# Patient Record
Sex: FEMALE | Race: WHITE | NOT HISPANIC OR LATINO | Employment: OTHER | URBAN - METROPOLITAN AREA
[De-identification: names, ages, dates, MRNs, and addresses within clinical notes are randomized per-mention and may not be internally consistent; named-entity substitution may affect disease eponyms.]

---

## 2021-06-01 ENCOUNTER — TRANSCRIBE ORDERS (OUTPATIENT)
Dept: ADMINISTRATIVE | Facility: HOSPITAL | Age: 61
End: 2021-06-01

## 2021-06-01 DIAGNOSIS — Z12.31 VISIT FOR SCREENING MAMMOGRAM: Primary | ICD-10-CM

## 2021-07-26 ENCOUNTER — PREP FOR SURGERY (OUTPATIENT)
Dept: OTHER | Facility: HOSPITAL | Age: 61
End: 2021-07-26

## 2021-07-26 ENCOUNTER — CLINICAL SUPPORT (OUTPATIENT)
Dept: GASTROENTEROLOGY | Facility: CLINIC | Age: 61
End: 2021-07-26

## 2021-07-26 ENCOUNTER — TELEPHONE (OUTPATIENT)
Dept: GASTROENTEROLOGY | Facility: CLINIC | Age: 61
End: 2021-07-26

## 2021-07-26 DIAGNOSIS — K92.1 BLOOD IN STOOL: Primary | ICD-10-CM

## 2021-07-26 RX ORDER — LANOLIN ALCOHOL/MO/W.PET/CERES
1000 CREAM (GRAM) TOPICAL DAILY
COMMUNITY

## 2021-07-26 RX ORDER — UBIDECARENONE 100 MG
100 CAPSULE ORAL DAILY
COMMUNITY

## 2021-07-26 RX ORDER — FLUTICASONE PROPIONATE 50 MCG
2 SPRAY, SUSPENSION (ML) NASAL DAILY PRN
COMMUNITY
Start: 2021-05-27

## 2021-07-26 RX ORDER — SODIUM, POTASSIUM,MAG SULFATES 17.5-3.13G
1 SOLUTION, RECONSTITUTED, ORAL ORAL EVERY 12 HOURS
Qty: 254 ML | Refills: 0 | Status: SHIPPED | OUTPATIENT
Start: 2021-07-26 | End: 2023-03-30

## 2021-07-26 RX ORDER — OMEPRAZOLE 40 MG/1
40 CAPSULE, DELAYED RELEASE ORAL DAILY
COMMUNITY
Start: 2021-07-23

## 2021-07-26 RX ORDER — CETIRIZINE HYDROCHLORIDE 10 MG/1
10 TABLET ORAL DAILY
COMMUNITY
Start: 2021-07-13

## 2021-07-26 RX ORDER — SERTRALINE HYDROCHLORIDE 25 MG/1
25 TABLET, FILM COATED ORAL DAILY
COMMUNITY
Start: 2021-07-13

## 2021-07-26 RX ORDER — PRAVASTATIN SODIUM 10 MG
10 TABLET ORAL DAILY
COMMUNITY
Start: 2021-07-23

## 2021-07-26 NOTE — PROGRESS NOTES
Spoke with pt on a date for colon of 09/28/2021. Updated chart with meds, history, and contacts. Went over suprep instructions and mailed them out. Put in order for colon and sent in prep  Answers for HPI/ROS submitted by the patient on 7/19/2021  Please describe your symptoms.: Bright red Blood when I have a bowel movement. On and off for a few months  Have you had these symptoms before?: Yes  How long have you been having these symptoms?: Greater than 2 weeks  Please list any medications you are currently taking for this condition.: None  Please describe any probable cause for these symptoms. : Some constipation due to sitting and driving for a long period  What is the primary reason for your visit?: Other

## 2021-08-13 ENCOUNTER — TRANSCRIBE ORDERS (OUTPATIENT)
Dept: ADMINISTRATIVE | Facility: HOSPITAL | Age: 61
End: 2021-08-13

## 2021-08-13 DIAGNOSIS — N63.0 BREAST MASS: Primary | ICD-10-CM

## 2021-08-19 ENCOUNTER — TRANSCRIBE ORDERS (OUTPATIENT)
Dept: ADMINISTRATIVE | Facility: HOSPITAL | Age: 61
End: 2021-08-19

## 2021-08-19 DIAGNOSIS — R22.30 AXILLARY FULLNESS: Primary | ICD-10-CM

## 2021-08-23 ENCOUNTER — HOSPITAL ENCOUNTER (OUTPATIENT)
Dept: MAMMOGRAPHY | Facility: HOSPITAL | Age: 61
Discharge: HOME | End: 2021-08-23

## 2021-08-23 ENCOUNTER — HOSPITAL ENCOUNTER (OUTPATIENT)
Dept: ULTRASOUND IMAGING | Facility: HOSPITAL | Age: 61
Discharge: HOME | End: 2021-08-23

## 2021-08-23 DIAGNOSIS — N63.0 BREAST MASS: ICD-10-CM

## 2021-08-23 PROCEDURE — 76641 ULTRASOUND BREAST COMPLETE: CPT

## 2021-08-23 PROCEDURE — 77065 DX MAMMO INCL CAD UNI: CPT

## 2021-08-26 ENCOUNTER — HOSPITAL ENCOUNTER (OUTPATIENT)
Dept: CT IMAGING | Facility: HOSPITAL | Age: 61
Discharge: HOME | End: 2021-08-26
Admitting: NURSE PRACTITIONER

## 2021-08-26 DIAGNOSIS — R22.30 AXILLARY FULLNESS: ICD-10-CM

## 2021-08-26 LAB — CREAT BLDA-MCNC: 1 MG/DL

## 2021-08-26 PROCEDURE — 82565 ASSAY OF CREATININE: CPT

## 2021-08-26 PROCEDURE — 0 IOPAMIDOL PER 1 ML: Performed by: NURSE PRACTITIONER

## 2021-08-26 PROCEDURE — 71260 CT THORAX DX C+: CPT

## 2021-08-26 RX ADMIN — IOPAMIDOL 100 ML: 755 INJECTION, SOLUTION INTRAVENOUS at 16:20

## 2021-09-20 ENCOUNTER — TRANSCRIBE ORDERS (OUTPATIENT)
Dept: ADMINISTRATIVE | Facility: HOSPITAL | Age: 61
End: 2021-09-20

## 2021-09-20 DIAGNOSIS — R59.0 AXILLARY ADENOPATHY: Primary | ICD-10-CM

## 2021-09-21 ENCOUNTER — ANESTHESIA (OUTPATIENT)
Dept: GASTROENTEROLOGY | Facility: HOSPITAL | Age: 61
End: 2021-09-21

## 2021-09-21 ENCOUNTER — ANESTHESIA EVENT (OUTPATIENT)
Dept: GASTROENTEROLOGY | Facility: HOSPITAL | Age: 61
End: 2021-09-21

## 2021-09-21 ENCOUNTER — HOSPITAL ENCOUNTER (OUTPATIENT)
Facility: HOSPITAL | Age: 61
Setting detail: HOSPITAL OUTPATIENT SURGERY
Discharge: HOME | End: 2021-09-21
Attending: INTERNAL MEDICINE | Admitting: INTERNAL MEDICINE

## 2021-09-21 VITALS
OXYGEN SATURATION: 99 % | HEART RATE: 70 BPM | HEIGHT: 64 IN | SYSTOLIC BLOOD PRESSURE: 108 MMHG | BODY MASS INDEX: 36.58 KG/M2 | DIASTOLIC BLOOD PRESSURE: 74 MMHG | TEMPERATURE: 97.8 F | WEIGHT: 214.29 LBS | RESPIRATION RATE: 17 BRPM

## 2021-09-21 DIAGNOSIS — K92.1 BLOOD IN STOOL: ICD-10-CM

## 2021-09-21 PROCEDURE — 88305 TISSUE EXAM BY PATHOLOGIST: CPT | Performed by: INTERNAL MEDICINE

## 2021-09-21 PROCEDURE — 25010000002 ATROPINE PER 0.01 MG: Performed by: NURSE ANESTHETIST, CERTIFIED REGISTERED

## 2021-09-21 PROCEDURE — 25010000002 PROPOFOL 10 MG/ML EMULSION: Performed by: NURSE ANESTHETIST, CERTIFIED REGISTERED

## 2021-09-21 PROCEDURE — 45385 COLONOSCOPY W/LESION REMOVAL: CPT | Performed by: INTERNAL MEDICINE

## 2021-09-21 RX ORDER — SODIUM CHLORIDE, SODIUM LACTATE, POTASSIUM CHLORIDE, CALCIUM CHLORIDE 600; 310; 30; 20 MG/100ML; MG/100ML; MG/100ML; MG/100ML
30 INJECTION, SOLUTION INTRAVENOUS CONTINUOUS
Status: DISCONTINUED | OUTPATIENT
Start: 2021-09-21 | End: 2021-09-21 | Stop reason: HOSPADM

## 2021-09-21 RX ORDER — PROPOFOL 10 MG/ML
VIAL (ML) INTRAVENOUS AS NEEDED
Status: DISCONTINUED | OUTPATIENT
Start: 2021-09-21 | End: 2021-09-21 | Stop reason: SURG

## 2021-09-21 RX ORDER — GLYCOPYRROLATE 0.2 MG/ML
INJECTION INTRAMUSCULAR; INTRAVENOUS AS NEEDED
Status: DISCONTINUED | OUTPATIENT
Start: 2021-09-21 | End: 2021-09-21 | Stop reason: SURG

## 2021-09-21 RX ORDER — LIDOCAINE HYDROCHLORIDE 20 MG/ML
INJECTION, SOLUTION INFILTRATION; PERINEURAL AS NEEDED
Status: DISCONTINUED | OUTPATIENT
Start: 2021-09-21 | End: 2021-09-21 | Stop reason: SURG

## 2021-09-21 RX ORDER — ATROPINE SULFATE 0.4 MG/ML
AMPUL (ML) INJECTION AS NEEDED
Status: DISCONTINUED | OUTPATIENT
Start: 2021-09-21 | End: 2021-09-21 | Stop reason: SURG

## 2021-09-21 RX ADMIN — ATROPINE SULFATE 0.1 MG: 0.4 INJECTION, SOLUTION INTRAMUSCULAR; INTRAVENOUS; SUBCUTANEOUS at 12:05

## 2021-09-21 RX ADMIN — GLYCOPYRROLATE 0.2 MCG: 0.2 INJECTION INTRAMUSCULAR; INTRAVENOUS at 12:02

## 2021-09-21 RX ADMIN — PROPOFOL 200 MCG/KG/MIN: 10 INJECTION, EMULSION INTRAVENOUS at 12:01

## 2021-09-21 RX ADMIN — LIDOCAINE HYDROCHLORIDE 50 MG: 20 INJECTION, SOLUTION INFILTRATION; PERINEURAL at 12:00

## 2021-09-21 RX ADMIN — PROPOFOL 100 MG: 10 INJECTION, EMULSION INTRAVENOUS at 12:00

## 2021-09-21 RX ADMIN — SODIUM CHLORIDE, POTASSIUM CHLORIDE, SODIUM LACTATE AND CALCIUM CHLORIDE 30 ML/HR: 600; 310; 30; 20 INJECTION, SOLUTION INTRAVENOUS at 10:39

## 2021-09-21 NOTE — ANESTHESIA PREPROCEDURE EVALUATION
Anesthesia Evaluation     Patient summary reviewed and Nursing notes reviewed   History of anesthetic complications: PDPH.  NPO Solid Status: > 8 hours  NPO Liquid Status: > 2 hours           Airway   Mallampati: II  TM distance: >3 FB  Neck ROM: full  No difficulty expected  Dental      Pulmonary - negative pulmonary ROS and normal exam    breath sounds clear to auscultation  Cardiovascular - normal exam  Exercise tolerance: good (4-7 METS)    Rhythm: regular    (+) hyperlipidemia,       Neuro/Psych  (+) headaches, psychiatric history Anxiety,     GI/Hepatic/Renal/Endo    (+)  GERD, GI bleeding lower ,     Musculoskeletal (-) negative ROS    Abdominal    Substance History - negative use     OB/GYN negative ob/gyn ROS         Other - negative ROS                       Anesthesia Plan    ASA 2     general and MAC   total IV anesthesia(Patient understands anesthesia not responsible for dental damage.)  intravenous induction     Anesthetic plan, all risks, benefits, and alternatives have been provided, discussed and informed consent has been obtained with: patient.  Use of blood products discussed with patient .   Plan discussed with CRNA.

## 2021-09-21 NOTE — ANESTHESIA POSTPROCEDURE EVALUATION
Patient: Romana Cuello    Procedure Summary     Date: 09/21/21 Room / Location: [facility] ENDOSCOPY 2 / [facility] ENDOSCOPY    Anesthesia Start: 1156 Anesthesia Stop: 1222    Procedure: COLONOSCOPY WITH POLYPECTOMY (N/A ) Diagnosis:       Blood in stool      (Blood in stool [K92.1])    Surgeons: Brant Gunderson MD Provider:     Anesthesia Type: general, MAC ASA Status: 2          Anesthesia Type: general, MAC    Vitals  Vitals Value Taken Time   /74 09/21/21 1238   Temp 36.2 °C (97.1 °F) 09/21/21 1221   Pulse 76 09/21/21 1240   Resp 15 09/21/21 1225   SpO2 98 % 09/21/21 1240   Vitals shown include unvalidated device data.        Post Anesthesia Care and Evaluation    Patient location during evaluation: bedside  Patient participation: complete - patient participated  Level of consciousness: awake  Pain score: 0  Pain management: adequate  Airway patency: patent  Anesthetic complications: No anesthetic complications  PONV Status: none  Cardiovascular status: acceptable and stable  Respiratory status: acceptable and room air  Hydration status: acceptable    Comments: An Anesthesiologist personally participated in the most demanding procedures (including induction and emergence if applicable) in the anesthesia plan, monitored the course of anesthesia administration at frequent intervals and remained physically present and available for immediate diagnosis and treatment of emergencies.

## 2021-09-21 NOTE — H&P
Pre Procedure History & Physical    Chief Complaint:   Rectal bleeding    Subjective     HPI:   As above    Past Medical History:   Past Medical History:   Diagnosis Date   • Anxiety    • Elevated cholesterol    • GERD (gastroesophageal reflux disease)    • MS (congenital mitral stenosis)    • Seasonal allergies    • Spinal headache     SPINAL TAP       Past Surgical History:  Past Surgical History:   Procedure Laterality Date   • BREAST BIOPSY     • CHOLECYSTECTOMY     • COLONOSCOPY     • UPPER GASTROINTESTINAL ENDOSCOPY         Family History:  Family History   Problem Relation Age of Onset   • Colon cancer Paternal Uncle    • Malig Hyperthermia Neg Hx        Social History:   reports that she has never smoked. She has never used smokeless tobacco. She reports current alcohol use. She reports that she does not use drugs.    Medications:   Medications Prior to Admission   Medication Sig Dispense Refill Last Dose   • cetirizine (zyrTEC) 10 MG tablet Take 10 mg by mouth Daily.   9/21/2021 at Unknown time   • coenzyme Q10 100 MG capsule Take 100 mg by mouth Daily.   Past Week at Unknown time   • fluticasone (FLONASE) 50 MCG/ACT nasal spray 2 sprays into the nostril(s) as directed by provider Daily As Needed.   Past Month at Unknown time   • omeprazole (priLOSEC) 40 MG capsule Take 40 mg by mouth Daily.   9/21/2021 at Unknown time   • pravastatin (PRAVACHOL) 10 MG tablet Take 10 mg by mouth Daily.   9/21/2021 at Unknown time   • sertraline (ZOLOFT) 25 MG tablet Take 25 mg by mouth Daily.   9/21/2021 at Unknown time   • sodium-potassium-magnesium sulfates (Suprep Bowel Prep Kit) 17.5-3.13-1.6 GM/177ML solution oral solution Take 1 bottle by mouth Every 12 (Twelve) Hours. 254 mL 0 9/21/2021 at Unknown time   • vitamin B-12 (CYANOCOBALAMIN) 1000 MCG tablet Take 1,000 mcg by mouth Daily.   Past Week at Unknown time   • vitamin D3 125 MCG (5000 UT) capsule capsule Take 5,000 Units by mouth Daily.   Past Week at Unknown time  "      Allergies:  Codeine, Levaquin [levofloxacin], Lisinopril, and Sulfa antibiotics        Objective     Blood pressure 138/77, pulse 66, temperature 97.2 °F (36.2 °C), temperature source Temporal, resp. rate 20, height 162.7 cm (64.06\"), weight 97.2 kg (214 lb 4.6 oz), SpO2 96 %.    Physical Exam   Constitutional: Pt is oriented to person, place, and time and well-developed, well-nourished, and in no distress.   Mouth/Throat: Oropharynx is clear and moist.   Neck: Normal range of motion.   Cardiovascular: Normal rate, regular rhythm and normal heart sounds.    Pulmonary/Chest: Effort normal and breath sounds normal.   Abdominal: Soft. Nontender  Skin: Skin is warm and dry.   Psychiatric: Mood, memory, affect and judgment normal.     Assessment/Plan     Diagnosis:  Rectal bleeding    Anticipated Surgical Procedure:  colonoscopy    The risks, benefits, and alternatives of this procedure have been discussed with the patient or the responsible party- the patient understands and agrees to proceed.            "

## 2021-09-22 LAB
CYTO UR: NORMAL
LAB AP CASE REPORT: NORMAL
LAB AP CLINICAL INFORMATION: NORMAL
PATH REPORT.FINAL DX SPEC: NORMAL
PATH REPORT.GROSS SPEC: NORMAL

## 2021-09-24 ENCOUNTER — APPOINTMENT (OUTPATIENT)
Dept: ULTRASOUND IMAGING | Facility: HOSPITAL | Age: 61
End: 2021-09-24

## 2021-10-25 ENCOUNTER — HOSPITAL ENCOUNTER (OUTPATIENT)
Dept: ULTRASOUND IMAGING | Facility: HOSPITAL | Age: 61
Discharge: HOME | End: 2021-10-25
Admitting: FAMILY MEDICINE

## 2021-10-25 DIAGNOSIS — R59.0 AXILLARY ADENOPATHY: ICD-10-CM

## 2021-10-25 PROCEDURE — 76642 ULTRASOUND BREAST LIMITED: CPT

## 2021-11-08 ENCOUNTER — TRANSCRIBE ORDERS (OUTPATIENT)
Dept: ADMINISTRATIVE | Facility: HOSPITAL | Age: 61
End: 2021-11-08

## 2021-11-08 DIAGNOSIS — Z12.31 SCREENING MAMMOGRAM, ENCOUNTER FOR: Primary | ICD-10-CM

## 2021-11-29 ENCOUNTER — HOSPITAL ENCOUNTER (OUTPATIENT)
Dept: MAMMOGRAPHY | Facility: HOSPITAL | Age: 61
Discharge: HOME | End: 2021-11-29
Admitting: FAMILY MEDICINE

## 2021-11-29 DIAGNOSIS — Z12.31 SCREENING MAMMOGRAM, ENCOUNTER FOR: ICD-10-CM

## 2021-11-29 PROCEDURE — 77067 SCR MAMMO BI INCL CAD: CPT

## 2022-01-31 ENCOUNTER — OFFICE VISIT (OUTPATIENT)
Dept: SLEEP MEDICINE | Facility: HOSPITAL | Age: 62
End: 2022-01-31

## 2022-01-31 VITALS
TEMPERATURE: 98 F | OXYGEN SATURATION: 94 % | BODY MASS INDEX: 36.19 KG/M2 | DIASTOLIC BLOOD PRESSURE: 80 MMHG | HEART RATE: 91 BPM | HEIGHT: 64 IN | SYSTOLIC BLOOD PRESSURE: 170 MMHG | WEIGHT: 212 LBS

## 2022-01-31 DIAGNOSIS — G47.8 NON-RESTORATIVE SLEEP: ICD-10-CM

## 2022-01-31 DIAGNOSIS — G35 MULTIPLE SCLEROSIS: ICD-10-CM

## 2022-01-31 DIAGNOSIS — G47.30 OBSERVED SLEEP APNEA: Primary | ICD-10-CM

## 2022-01-31 DIAGNOSIS — G47.10 HYPERSOMNIA: ICD-10-CM

## 2022-01-31 DIAGNOSIS — R06.83 SNORING: ICD-10-CM

## 2022-01-31 PROCEDURE — G0463 HOSPITAL OUTPT CLINIC VISIT: HCPCS | Performed by: INTERNAL MEDICINE

## 2022-01-31 PROCEDURE — 99244 OFF/OP CNSLTJ NEW/EST MOD 40: CPT | Performed by: INTERNAL MEDICINE

## 2022-01-31 NOTE — PROGRESS NOTES
[facility] [address] [address]  [place]   [place] [address]  Phone: [phone]  Fax: [phone]      Romana Cuello  1960  61 y.o.  female      Referring physician/provider: Eugene Vaughan MD   PCP Eugene Vaughan MD    Type of service: Initial Sleep Medicine Consult.  Date of service: 1/31/2022      Chief Complaint   Patient presents with   • Witnessed Apnea   • Snoring   • Fatigue   • Obesity   • Daytime Sleepiness       History of present illness;  Thank you for asking to see Romana Cuello, 61 y.o.. The patient was seen today on 1/31/2022 at [facility].  The patient presents today with symptoms of snoring, non-restorative sleep and witnessed apneas. The symptoms are present for few years and they are persistent in nature.  The snoring is present in all positions and it is loud.  Patient has no prior surgery namely tonsillectomy, nasal surgery and UPPP.   she moved to [place] about 2 years ago.  She is not working at present and complains of having symptoms of sleep apnea    Patient gives the following sleep history.  Sleep schedule:  Bedtime: 10 PM  Wake time: 9 AM  Normally takes about several hoursto fall asleep  Average hours of sleep 7-1/2  Number of naps per day 1  Symptoms  In addition to snoring, nonrestorative sleep and witnessed apneas patient gives the following associated symptoms.  Have you ever awakened gasping for breath, coughing, choking: Yes   Change in weight,  Yes gained about 10 pounds  Morning headaches  No   Awaken with a sore throat or dry mouth  Yes   Leg jerking at night:  No   Crawly feeling/urge sensation to move in the legs: No   Teeth grinding:No   Have you ever awakened at night with a sour taste or burning sensation in your chest:  No   Do you have muscle weakness with laughing or anger or sleep paralysis:  No   Have you ever felt paralyzed while going to sleep or waking up:  No   Sleepwalking, nightmares, No   Nocturia (urination  "at night): 1 times per night  Memory Problem:Yes     MEDICAL CONDITIONS (PMH)  1. History of multiple sclerosis in remission  2. Hyperlipidemia  3. Depression  4. GERD    Social history:  Do you drive a commercial vehicle:  No   Shift work:  No   Tobacco use:  No   Alcohol use: 1 per week  Caffeinated drinks: 1    Family Hx (your parents and siblings)  1. Sleep apnea  2. Restless leg syndrome    Medications: reviewed    Review of systems:  Sleep: Positve for snoring,witnessed apnea and daytime excessive sleepiness with [place] Sleepiness Scale of Total score: 7   Kidney/ Bladder  Difficulty In Urination: negative  Bed Wetting: negative  Frequent Urination: negative  HEENT  Recurrent Nose Bleeds: negative  Ear pain: negative  Sores In Mouth: negative  Persistent Hoarseness: negative  Nasal Congestion: positive  Post Nasal Drip: positive  Musculoskeletal:  Neck Pain: negative  Temporomandibular Joint Pain: negative  General:  Fever: negative  Fatigue: positive  Cardiovascular:  Irregular Heartbeat: negative  Swollen Ankles Or Legs: negative  Respiratory:  Shortness Of Breath: negative  Wheezing: negative  Neuro/Paych:  Fainting Spells: negative  Dizziness: negative  Anxiety: negative  Depression: negative  Gastrointestinal:  Problem Swallowing: negative  Frequent Heartburn: negative  Abdominal Bloating: negative  Skin:  Rash: negative  Change In Nails: negative  Endocrine:  Excessive Thirst: negative  Always Too Cold: negative  Always Too Warm: negative  Hem/Lymphatic:  Swollen Glands: negative  Burses/ Bleeds Easily: negative    Physical exam:  CONSTITUTINONAL:  Vitals:    01/31/22 0900   BP: 170/80   Pulse: 91   Temp: 98 °F (36.7 °C)   SpO2: 94%   Weight: 96.2 kg (212 lb)   Height: 162.6 cm (64\")    Body mass index is 36.39 kg/m².   HEAD: atraumatic, normocephalic   EYES:pupils are round, equal and reacting to light and accommodation, conjunctiva normal  NOSE:no nasal septal defects, nasal passages are clear, no " nasal polyps,   THROAT: tonsils are not enlarged, tongue normal size, oral airway Mallampati class 3  NECK: , trachea is in the midline, thyroid not enlarged  RESPIRATORY SYSTEM: Breath sounds are normal, there are no wheezes  CARDIOVASULAR SYSTEM: Heart sounds are regular rhythm and normal rate, there are no murmurs or thrills, no edema  GASTROINTESTINAL: Soft and non-tender,liver not enlarged, no evidence of ascites  MUSCULOSKELETAL SYSTEM: Full range of motion's in all the 4 extremities, neck movement not restricted, temporomandibular joint movement normal and no tenderness  SKIN: Warm and moist, no cyanosis, no clubbing,  NEUROLOGICAL SYSTEM: Oriented x 3, no gross neurological defects, No speech defect, gait is normal  PSYCHIATRIC SYSTEM: Mood is normal, thought content is normal      Assessment and plan:  · Witnessed apneas,(R06.81) patient's symptoms and examination is consistent with sleep apnea (G47.30). I have talked to the patient about the signs and symptoms of sleep apnea. In addition, I have also discussed pathophysiology of sleep apnea.  I also discussed the complications of untreated sleep apnea including effects on hypertension, diabetes mellitus and nonrestorative sleep with hypersomnia which can increase risk for motor vehicle accidents.  Untreated sleep apnea is also a risk factor for development of atrial fibrillation, pulmonary hypertension and stroke.  Discussed in detail of various testing methods including home-based and lab based sleep studies.  Based on history and physical examination and other comorbidities the most appropriate study is full night polysomnography.  The order for the sleep study is placed in [facility].  The test will be scheduled after approval from insurance. Treatment and management will be discussed after the test is completed.  Patient was given opportunity to ask questions and all the questions were answered.   · Snoring (R06.83), snoring is the sound created by turbulent  airflow vibrating upper airway soft tissue due to limitation of inspiratory airflow. I have also discussed factors affecting snoring including sleep deprivation, sleeping on the back and alcohol ingestion. To minimize snoring, patient is advised to have adequate sleep, sleep on the side and avoid alcohol and sedative medications before bedtime  · Daytime excessive sleepiness .  It was assessed with [place] Sleepiness Scale of Total score: 7.  There are many causes for daytime excessive sleepiness including sleep depression, shiftwork syndrome, depression and other medical disorders including heart, kidney and liver failure.  The most serious cause of excessive sleepiness is due to neurological conditions like narcolepsy/cataplexy.  But the most common cause of excessive sleepiness is due to sleep apnea with frequent awakenings during sleep time.  I have discussed safety of driving and to remain vigilant while driving.  · Obesity class 2, patient's BMI is Body mass index is 36.39 kg/m².. I have discussed the relationship between weight and sleep apnea.There is direct correlation between weight and severity of sleep apnea.  Weight reduction is encouraged, as it is going to reduce the severity of sleep apnea. I have also discussed with the patient diet and exercise to achieve ideal body weight.  · Multiple sclerosis, previously patient was on treatment now she is in remission and after treatment but will be getting an MRI in few months to reassess.    I have also discussed with the patient the following  • Sleep hygiene: Maintaining a regular bedtime and wake time, not to watch television or work in bed, limit caffeine-containing beverages before bed time and avoid naps during the day  • Adequate amount of sleep.  Generally most people needs about 7 to 8 hours of sleep.    Return for 31 to 90 days after PAP setup with down load..  Patient's questions were answered      I once again thank you for asking me to see this  patient in consultation and I have forwarded my opinion and treatment plan.  Please do not hesitate to call me if you have any questions.     Becca Edward MD  Sleep Medicine  Medical Director, [facility] [place] [facility]  1/31/2022 ,

## 2022-02-24 ENCOUNTER — HOSPITAL ENCOUNTER (OUTPATIENT)
Dept: SLEEP MEDICINE | Facility: HOSPITAL | Age: 62
Discharge: HOME | End: 2022-02-24
Admitting: INTERNAL MEDICINE

## 2022-02-24 DIAGNOSIS — G47.30 OBSERVED SLEEP APNEA: ICD-10-CM

## 2022-02-24 DIAGNOSIS — G47.10 HYPERSOMNIA: ICD-10-CM

## 2022-02-24 DIAGNOSIS — R06.83 SNORING: ICD-10-CM

## 2022-02-24 DIAGNOSIS — G35 MULTIPLE SCLEROSIS: ICD-10-CM

## 2022-02-24 DIAGNOSIS — G47.8 NON-RESTORATIVE SLEEP: ICD-10-CM

## 2022-02-24 PROCEDURE — 95810 POLYSOM 6/> YRS 4/> PARAM: CPT

## 2022-02-24 PROCEDURE — 95810 POLYSOM 6/> YRS 4/> PARAM: CPT | Performed by: INTERNAL MEDICINE

## 2022-02-28 DIAGNOSIS — G47.33 SEVERE OBSTRUCTIVE SLEEP APNEA: Primary | ICD-10-CM

## 2022-02-28 DIAGNOSIS — R06.83 SNORING: ICD-10-CM

## 2022-02-28 DIAGNOSIS — G35 MULTIPLE SCLEROSIS: ICD-10-CM

## 2022-03-28 ENCOUNTER — HOSPITAL ENCOUNTER (OUTPATIENT)
Dept: SLEEP MEDICINE | Facility: HOSPITAL | Age: 62
Discharge: HOME | End: 2022-03-28
Admitting: INTERNAL MEDICINE

## 2022-03-28 DIAGNOSIS — G47.33 SEVERE OBSTRUCTIVE SLEEP APNEA: ICD-10-CM

## 2022-03-28 DIAGNOSIS — G35 MULTIPLE SCLEROSIS: ICD-10-CM

## 2022-03-28 DIAGNOSIS — R06.83 SNORING: ICD-10-CM

## 2022-03-28 PROCEDURE — 95811 POLYSOM 6/>YRS CPAP 4/> PARM: CPT | Performed by: INTERNAL MEDICINE

## 2022-03-28 PROCEDURE — 95811 POLYSOM 6/>YRS CPAP 4/> PARM: CPT

## 2022-03-30 DIAGNOSIS — G35 MULTIPLE SCLEROSIS: ICD-10-CM

## 2022-03-30 DIAGNOSIS — R06.83 SNORING: ICD-10-CM

## 2022-03-30 DIAGNOSIS — G47.33 SEVERE OBSTRUCTIVE SLEEP APNEA: Primary | ICD-10-CM

## 2022-03-31 ENCOUNTER — TELEPHONE (OUTPATIENT)
Dept: SLEEP MEDICINE | Facility: HOSPITAL | Age: 62
End: 2022-03-31

## 2022-07-06 ENCOUNTER — OFFICE VISIT (OUTPATIENT)
Dept: SLEEP MEDICINE | Facility: HOSPITAL | Age: 62
End: 2022-07-06

## 2022-07-06 VITALS
WEIGHT: 235 LBS | OXYGEN SATURATION: 95 % | HEART RATE: 75 BPM | DIASTOLIC BLOOD PRESSURE: 73 MMHG | BODY MASS INDEX: 40.12 KG/M2 | SYSTOLIC BLOOD PRESSURE: 142 MMHG | HEIGHT: 64 IN

## 2022-07-06 DIAGNOSIS — Z99.89 OSA ON CPAP: Primary | ICD-10-CM

## 2022-07-06 DIAGNOSIS — G47.33 OSA ON CPAP: Primary | ICD-10-CM

## 2022-07-06 DIAGNOSIS — E66.9 CLASS 2 OBESITY: ICD-10-CM

## 2022-07-06 PROBLEM — G47.10 HYPERSOMNIA: Status: RESOLVED | Noted: 2022-01-31 | Resolved: 2022-07-06

## 2022-07-06 PROBLEM — R06.83 SNORING: Status: RESOLVED | Noted: 2022-01-31 | Resolved: 2022-07-06

## 2022-07-06 PROBLEM — E66.812 CLASS 2 OBESITY: Status: ACTIVE | Noted: 2022-07-06

## 2022-07-06 PROBLEM — G47.8 NON-RESTORATIVE SLEEP: Status: RESOLVED | Noted: 2022-01-31 | Resolved: 2022-07-06

## 2022-07-06 PROCEDURE — 99213 OFFICE O/P EST LOW 20 MIN: CPT | Performed by: INTERNAL MEDICINE

## 2022-07-06 PROCEDURE — G0463 HOSPITAL OUTPT CLINIC VISIT: HCPCS | Performed by: INTERNAL MEDICINE

## 2022-07-06 NOTE — PROGRESS NOTES
"  [address] [address]  Phone: [phone]  Fax: [phone]      SLEEP CLINIC FOLLOW UP PROGRESS NOTE.    Romana Cuello  [id number]   1960  62 y.o.  female      PCP: Eugene Vaughan MD      Date of visit: 7/6/2022    Chief Complaint   Patient presents with   • Sleep Apnea   • Obesity       HPI:  This is a 62 y.o. years old patient is here for the management of obstructive sleep apnea.  Sleep apnea is severe in severity with a AHI of 49/hr. Patient is using positive airway pressure therapy with CPAP 10 and the symptoms of snoring, non-restorative sleep and daytime excessive sleepiness have improved significantly on the therapy. Normally goes to bed at between 10 and 12 midnight and wakes up at between 7 AM to 9 AM.  The patient wakes up 1-2 time(s) during the night and has no problem going back to sleep.  Feels refreshed after waking up.  Patient also denies headaches and nasal congestion.  Feels well rested when she wakes up    Medications and allergies are reviewed by me and documented in the encounter.     SOCIAL (habits pertaining to sleep medicine)  History tobacco use:No   History of alcohol use: 0 per week  Caffeine use: 1     REVIEW OF SYSTEMS:   [place] Sleepiness Scale :Total score: 4   Nasal congestion:Yes   Dry mouth/nose:Yes   Post nasal drip; No   Acid reflux/Heartburn:No   Abd bloating:No   Morning headache:No   Anxiety:No   Depression:No    PHYSICAL EXAMINATION:  CONSTITUTIONAL:  Vitals:    07/06/22 0900   BP: 142/73   Pulse: 75   SpO2: 95%   Weight: 107 kg (235 lb)   Height: 162.6 cm (64\")    Body mass index is 40.34 kg/m².   NOSE: nasal passages are clear, No deformities noted   RESP SYSTEM: Not in any respiratory distress, no chest deformities noted,   CARDIOVASULAR: No edema noted  NEURO: Oriented x 3, gait normal,  Mood and affect appeared appropriate      Data reviewed:  The Smart card downloaded on 7/6/2022 has been reviewed " independently by me for compliance and discussed the data with the patient.   Compliance; 85%  More than 4 hr use, 80%  Average use of the device 5 hours and 47 per night  Residual AHI: Two-point /hr (goal < 5.0 /hr)  Mask type: Fullface mask  Device: ResMed  DME: [employer]      ASSESSMENT AND PLAN:  · Obstructive sleep apnea ( G 47.33).  The symptoms of sleep apnea have improved with the device and the treatment.  Patient's compliance with the device is excellent for treatment of sleep apnea.  I have independently reviewed the smart card down load and discussed with the patient the download data and encouarged the patient to continue to use the device.The residual AHI is acceptable. The device is benefiting the patient and the device is medically necessary.  Without proper control of sleep apnea and good compliance there is a increased risk for hypertension, diabetes mellitus and nonrestorative sleep with hypersomnia which can increase risk for motor vehicle accidents.  Untreated sleep apnea is also a risk factor for development of atrial fibrillation, pulmonary hypertension and stroke. The patient is also instructed to get the supplies from the [facility] and and change them on a regular basis.  A prescription for supplies has been sent to the [facility].  I have also discussed the good sleep hygiene habits and adequate amount of sleep needed for good health.  · Obesity morbid with BMI is Body mass index is 40.34 kg/m².. I have discuss the relationship between the weight and sleep apnea. The benefit of weight loss in reducing severity of sleep apnea was discussed. Discussed diet and exercise with the patient to achieve ideal BMI.   · Return in about 1 year (around 7/6/2023) for Annual visit with smartcard download. . Patient's questions were answered.      Becca Edward MD  Sleep Medicine.  Medical Director, [facility]  7/6/2022 ,

## 2022-11-03 ENCOUNTER — TRANSCRIBE ORDERS (OUTPATIENT)
Dept: ADMINISTRATIVE | Facility: HOSPITAL | Age: 62
End: 2022-11-03

## 2022-11-03 DIAGNOSIS — Z12.31 SCREENING MAMMOGRAM FOR BREAST CANCER: Primary | ICD-10-CM

## 2022-11-17 ENCOUNTER — TRANSCRIBE ORDERS (OUTPATIENT)
Dept: ADMINISTRATIVE | Facility: HOSPITAL | Age: 62
End: 2022-11-17

## 2022-11-17 ENCOUNTER — HOSPITAL ENCOUNTER (OUTPATIENT)
Dept: GENERAL RADIOLOGY | Facility: HOSPITAL | Age: 62
Discharge: HOME | End: 2022-11-17
Admitting: PSYCHIATRY & NEUROLOGY

## 2022-11-17 DIAGNOSIS — M54.50 LOW BACK PAIN, UNSPECIFIED BACK PAIN LATERALITY, UNSPECIFIED CHRONICITY, UNSPECIFIED WHETHER SCIATICA PRESENT: Primary | ICD-10-CM

## 2022-11-17 DIAGNOSIS — M54.50 LOW BACK PAIN, UNSPECIFIED BACK PAIN LATERALITY, UNSPECIFIED CHRONICITY, UNSPECIFIED WHETHER SCIATICA PRESENT: ICD-10-CM

## 2022-11-17 PROCEDURE — 72114 X-RAY EXAM L-S SPINE BENDING: CPT

## 2023-01-03 ENCOUNTER — HOSPITAL ENCOUNTER (OUTPATIENT)
Dept: MAMMOGRAPHY | Facility: HOSPITAL | Age: 63
Discharge: HOME | End: 2023-01-03
Admitting: OBSTETRICS & GYNECOLOGY
Payer: COMMERCIAL

## 2023-01-03 DIAGNOSIS — Z12.31 SCREENING MAMMOGRAM FOR BREAST CANCER: ICD-10-CM

## 2023-01-03 PROCEDURE — 77067 SCR MAMMO BI INCL CAD: CPT

## 2023-01-03 PROCEDURE — 77063 BREAST TOMOSYNTHESIS BI: CPT

## 2023-03-30 ENCOUNTER — OFFICE VISIT (OUTPATIENT)
Dept: OTOLARYNGOLOGY | Facility: CLINIC | Age: 63
End: 2023-03-30
Payer: COMMERCIAL

## 2023-03-30 VITALS
WEIGHT: 248.6 LBS | BODY MASS INDEX: 42.44 KG/M2 | TEMPERATURE: 97.3 F | HEIGHT: 64 IN | DIASTOLIC BLOOD PRESSURE: 83 MMHG | SYSTOLIC BLOOD PRESSURE: 142 MMHG | HEART RATE: 79 BPM

## 2023-03-30 DIAGNOSIS — H93.90 EAR LESION: ICD-10-CM

## 2023-03-30 DIAGNOSIS — H81.10 BENIGN PAROXYSMAL POSITIONAL VERTIGO, UNSPECIFIED LATERALITY: Primary | ICD-10-CM

## 2023-03-30 PROCEDURE — 99204 OFFICE O/P NEW MOD 45 MIN: CPT | Performed by: NURSE PRACTITIONER

## 2023-03-30 RX ORDER — FERROUS SULFATE 325(65) MG
325 TABLET ORAL
COMMUNITY

## 2023-03-30 RX ORDER — BLOOD SUGAR DIAGNOSTIC
STRIP MISCELLANEOUS
COMMUNITY
Start: 2023-01-20

## 2023-03-30 RX ORDER — MECLIZINE HYDROCHLORIDE 25 MG/1
TABLET ORAL
COMMUNITY
Start: 2023-01-18

## 2023-03-30 RX ORDER — BIOTIN 10000 MCG
CAPSULE ORAL
COMMUNITY

## 2023-03-30 RX ORDER — LEVOTHYROXINE SODIUM 0.03 MG/1
1 TABLET ORAL DAILY
COMMUNITY
Start: 2023-02-25

## 2023-03-30 RX ORDER — ALPRAZOLAM 0.25 MG/1
0.25 TABLET ORAL
COMMUNITY

## 2023-03-30 RX ORDER — ALBUTEROL SULFATE 2.5 MG/3ML
SOLUTION RESPIRATORY (INHALATION)
COMMUNITY
Start: 2023-01-18

## 2023-03-30 RX ORDER — FLUTICASONE PROPIONATE 50 MCG
SPRAY, SUSPENSION (ML) NASAL
COMMUNITY

## 2023-03-30 NOTE — PROGRESS NOTES
Patient Name: Romana Cuello   Visit Date: 03/30/2023   Patient ID: [id number]  Provider: LEEROY Calhoun    Sex: female  Location: [facility]   YOB: 1960  Location Address: [address] [place] [address], [address],?[place]?[address]    Primary Care Provider Eugene Vaughan MD  Location Phone: [phone]    Referring Provider: Rebeka Cartwright*        Chief Complaint  Dizziness (New Patient)    Subjective   Romana Cuello is a 62 y.o. female who presents to [facility] today as a consult from Rebeka Quarles for evaluation of vertigo.  Patient states this is been ongoing for the last 2 months.  She states it occurs whenever she goes to get up in the morning or lay down at night.  She states she will get a room spinning sensation that will last for less than a minute.  She states that this occurs basically every day.  She was prescribed meclizine but states it does not change her symptoms.  She denies any ear fullness symptoms, otalgia, changes to her hearing or tinnitus symptoms.  She states she has not had issues with vertigo in the past.    Patient also reports for several months she has had a sore spot on her right ear.  She states it was treated with a steroid ointment for 2 weeks but this did not change the area.  She states that she does pick at it frequently and it is tender to lay on.    Current Outpatient Medications on File Prior to Visit   Medication Sig   • albuterol (PROVENTIL) (2.5 MG/3ML) 0.083% nebulizer solution USE ONE VIAL VIA NEBULIZER EVERY 4 HOURS AS NEEDED   • ALPRAZolam (XANAX) 0.25 MG tablet Take 1 tablet by mouth.   • Biotin 10 MG capsule biotin 10,000 mcg capsule   take one capsule by mouth every day   • cetirizine (zyrTEC) 10 MG tablet Take 1 tablet by mouth Daily.   • coenzyme Q10 100 MG capsule Take 1 capsule by mouth Daily.   • ferrous sulfate 325 (65 FE) MG tablet Take 1 tablet by mouth.   • fluticasone  "(FLONASE) 50 MCG/ACT nasal spray 2 sprays into the nostril(s) as directed by provider Daily As Needed.   • [facility] COVID-19 Rapid Test kit use for at home covid testing AS DIRECTED on package   • levothyroxine (SYNTHROID, LEVOTHROID) 25 MCG tablet Take 1 tablet by mouth Daily.   • omeprazole (priLOSEC) 40 MG capsule Take 1 capsule by mouth Daily.   • pravastatin (PRAVACHOL) 10 MG tablet Take 1 tablet by mouth Daily.   • sertraline (ZOLOFT) 50 MG tablet Take 1 tablet by mouth Daily.   • vitamin B-12 (CYANOCOBALAMIN) 1000 MCG tablet Take 1 tablet by mouth Daily.   • vitamin D3 125 MCG (5000 UT) capsule capsule Take 1 capsule by mouth Daily.   • fluticasone (FLONASE) 50 MCG/ACT nasal spray 2 times daily. (Patient not taking: Reported on 3/30/2023)   • meclizine (ANTIVERT) 25 MG tablet TAKE 1 TABLET BY MOUTH EVERY 6 HOURS AS NEEDED FOR VERTIGO (Patient not taking: Reported on 3/30/2023)   • sertraline (ZOLOFT) 25 MG tablet Take 25 mg by mouth Daily. (Patient not taking: Reported on 3/30/2023)   • sodium-potassium-magnesium sulfates (Suprep Bowel Prep Kit) 17.5-3.13-1.6 GM/177ML solution oral solution Take 1 bottle by mouth Every 12 (Twelve) Hours.     No current facility-administered medications on file prior to visit.        Social History     Tobacco Use   • Smoking status: Never   • Smokeless tobacco: Never   Vaping Use   • Vaping Use: Never used   Substance Use Topics   • Alcohol use: Yes     Comment: RARELY   • Drug use: Never       Objective     Vital Signs:   /83 (BP Location: Left arm, Patient Position: Sitting, Cuff Size: Adult)   Pulse 79   Temp 97.3 °F (36.3 °C) (Temporal)   Ht 162.6 cm (64\")   Wt 113 kg (248 lb 9.6 oz)   BMI 42.67 kg/m²       Physical Exam  Constitutional:       General: She is not in acute distress.     Appearance: Normal appearance. She is not ill-appearing.   HENT:      Head: Normocephalic and atraumatic.      Jaw: There is normal jaw occlusion. No tenderness or pain on " movement.      Salivary Glands: Right salivary gland is not diffusely enlarged or tender. Left salivary gland is not diffusely enlarged or tender.      Right Ear: Tympanic membrane, ear canal and external ear normal.      Left Ear: Tympanic membrane, ear canal and external ear normal.      Ears:        Nose: Nose normal. No septal deviation.      Right Sinus: No maxillary sinus tenderness or frontal sinus tenderness.      Left Sinus: No maxillary sinus tenderness or frontal sinus tenderness.      Mouth/Throat:      Lips: Pink. No lesions.      Mouth: Mucous membranes are moist. No oral lesions.      Dentition: Normal dentition.      Tongue: No lesions.      Palate: No mass and lesions.      Pharynx: Oropharynx is clear. Uvula midline.      Tonsils: No tonsillar exudate.   Eyes:      Extraocular Movements: Extraocular movements intact.      Conjunctiva/sclera: Conjunctivae normal.      Pupils: Pupils are equal, round, and reactive to light.   Neck:      Thyroid: No thyroid mass, thyromegaly or thyroid tenderness.      Trachea: Trachea normal.   Pulmonary:      Effort: Pulmonary effort is normal. No respiratory distress.   Musculoskeletal:         General: Normal range of motion.      Cervical back: Normal range of motion and neck supple. No tenderness.   Lymphadenopathy:      Cervical: No cervical adenopathy.   Skin:     General: Skin is warm and dry.   Neurological:      General: No focal deficit present.      Mental Status: She is alert and oriented to person, place, and time.      Cranial Nerves: Cranial nerves 2-12 are intact. No cranial nerve deficit.      Sensory: Sensation is intact.      Motor: Motor function is intact. No weakness.      Coordination: Coordination is intact.      Gait: Gait is intact. Gait normal.      Comments: Fenton-Hallpike maneuver negative for geotropic nystagmus however she conveyed some subjective vertigo symptoms on each side upon rising from a lying position   Psychiatric:         Mood  and Affect: Mood normal.         Behavior: Behavior normal.         Thought Content: Thought content normal.         Judgment: Judgment normal.               Result Review :              Assessment and Plan    Diagnoses and all orders for this visit:    1. Benign paroxysmal positional vertigo, unspecified laterality (Primary)    2. Ear lesion    On examination today middle ears are free of any fluid or infection.  Paula-Hallpike maneuver was negative for geotropic nystagmus however she did convey some subjective vertigo symptoms on each side upon rising from a lying position.  It sounds like she is having episodes of benign paroxysmal positional vertigo.  We have discussed this condition and I will set her up for vestibular therapy with a physical therapist.  Additionally she does have a small what appears to be pickers nodule on the right ear.  I did have this evaluated by Dr. Boles who encouraged her to stop picking at it and apply Vaseline twice daily.  If this does not heal within a few weeks he will see her back for possible biopsy of this area.       Follow Up   No follow-ups on file.  Patient was given instructions and counseling regarding her condition or for health maintenance advice. Please see specific information pulled into the AVS if appropriate.      LEEROY Calhoun

## 2025-04-14 ENCOUNTER — TRANSCRIBE ORDERS (OUTPATIENT)
Dept: ADMINISTRATIVE | Facility: HOSPITAL | Age: 65
End: 2025-04-14
Payer: COMMERCIAL

## 2025-04-14 DIAGNOSIS — Z12.31 VISIT FOR SCREENING MAMMOGRAM: Primary | ICD-10-CM

## 2025-06-11 ENCOUNTER — OFFICE VISIT (OUTPATIENT)
Dept: SLEEP MEDICINE | Facility: HOSPITAL | Age: 65
End: 2025-06-11
Payer: MEDICARE

## 2025-06-11 VITALS
SYSTOLIC BLOOD PRESSURE: 131 MMHG | DIASTOLIC BLOOD PRESSURE: 73 MMHG | OXYGEN SATURATION: 95 % | WEIGHT: 255.8 LBS | BODY MASS INDEX: 43.67 KG/M2 | HEIGHT: 64 IN | HEART RATE: 79 BPM

## 2025-06-11 DIAGNOSIS — E66.812 CLASS 2 OBESITY: ICD-10-CM

## 2025-06-11 DIAGNOSIS — G47.33 OSA ON CPAP: Primary | ICD-10-CM

## 2025-06-11 PROCEDURE — G0463 HOSPITAL OUTPT CLINIC VISIT: HCPCS

## 2025-06-11 PROCEDURE — 1159F MED LIST DOCD IN RCRD: CPT | Performed by: INTERNAL MEDICINE

## 2025-06-11 PROCEDURE — 1160F RVW MEDS BY RX/DR IN RCRD: CPT | Performed by: INTERNAL MEDICINE

## 2025-06-11 PROCEDURE — 99214 OFFICE O/P EST MOD 30 MIN: CPT | Performed by: INTERNAL MEDICINE

## 2025-06-11 RX ORDER — DULOXETIN HYDROCHLORIDE 60 MG/1
60 CAPSULE, DELAYED RELEASE ORAL DAILY
COMMUNITY
Start: 2025-05-21 | End: 2025-11-17

## 2025-06-11 RX ORDER — AMANTADINE HYDROCHLORIDE 100 MG/1
TABLET ORAL
COMMUNITY
Start: 2025-05-15 | End: 2025-06-21

## 2025-06-11 RX ORDER — TIRZEPATIDE 2.5 MG/.5ML
2.5 INJECTION, SOLUTION SUBCUTANEOUS
COMMUNITY
Start: 2025-05-09 | End: 2025-06-11

## 2025-06-11 RX ORDER — LEVOTHYROXINE SODIUM 75 UG/1
75 TABLET ORAL DAILY
COMMUNITY
Start: 2025-04-14 | End: 2026-04-14

## 2025-06-11 RX ORDER — TRIAMCINOLONE ACETONIDE 1 MG/G
CREAM TOPICAL
COMMUNITY
Start: 2025-05-09 | End: 2025-07-08

## 2025-06-11 RX ORDER — NYSTATIN AND TRIAMCINOLONE ACETONIDE 100000; 1 [USP'U]/G; MG/G
CREAM TOPICAL 4 TIMES DAILY
COMMUNITY
Start: 2025-04-14

## 2025-06-11 NOTE — PROGRESS NOTES
"  [facility]  Sleep medicine  [address] [place] [address]  [place]   [place] [address]  Phone: [phone]  Fax: [phone]      SLEEP CLINIC FOLLOW UP PROGRESS NOTE.    Romana Cuello  [id number]   1960  65 y.o.  female      PCP: Eugene Vaughan MD      Date of visit: 6/11/2025    Chief Complaint   Patient presents with    Sleep Apnea    Obesity       HPI:  This is a 65 y.o. years old patient is here for the management of obstructive sleep apnea.  Sleep apnea is severe in severity with a AHI of 49/hr. Patient is using positive airway pressure therapy with CPAP 10 and the symptoms of snoring, non-restorative sleep and daytime excessive sleepiness have improved significantly on the therapy. Normally goes to bed at between 10 and 12 midnight and wakes up at between 7 AM to 9 AM.  The patient wakes up 1-2 time(s) during the night and has no problem going back to sleep.  Feels refreshed after waking up.  Patient also denies headaches and nasal congestion.  Feels well rested when she wakes up    She recently now got Medicare insurance and this is visit for Medicare face-to-face evaluation    She also has a diagnosis of multiple sclerosis and she says that it is in remission and not on any treatment now    Medications and allergies are reviewed by me and documented in the encounter.     SOCIAL (habits pertaining to sleep medicine)  History tobacco use:No   History of alcohol use: 0 per week  Caffeine use: 1     REVIEW OF SYSTEMS:   Brusly Sleepiness Scale :Total score: 12   Nasal congestion:Yes   Dry mouth/nose:Yes   Post nasal drip; No   Acid reflux/Heartburn:No   Abd bloating:No   Morning headache:No   Anxiety:No   Depression:No    PHYSICAL EXAMINATION:  CONSTITUTIONAL:  Vitals:    06/11/25 1100   BP: 131/73   Pulse: 79   SpO2: 95%   Weight: 116 kg (255 lb 12.8 oz)   Height: 162.6 cm (64.02\")    Body mass index is 43.89 kg/m².   NOSE: nasal passages are clear, No deformities noted   RESP SYSTEM: Not " in any respiratory distress, no chest deformities noted,   CARDIOVASULAR: No edema noted  NEURO: Oriented x 3, gait normal,  Mood and affect appeared appropriate      Data reviewed:  The Smart card downloaded on 6/11/2025 has been reviewed independently by me for compliance and discussed the data with the patient.   Compliance; 97%  More than 4 hr use, 83%  Average use of the device 6 hours and 47 minutes per night  Residual AHI: 2.1 /hr (goal < 5.0 /hr)  Mask type nasal cushion  Device: ResMed  DME: [facility]      ASSESSMENT AND PLAN:  Obstructive sleep apnea ( G 47.33).  The symptoms of sleep apnea have improved with the device and the treatment.  Patient's compliance with the device is excellent for treatment of sleep apnea.  I have independently reviewed the smart card down load and discussed with the patient the download data and encouarged the patient to continue to use the device.The residual AHI is acceptable. The device is benefiting the patient and the device is medically necessary.  Without proper control of sleep apnea and good compliance there is a increased risk for hypertension, diabetes mellitus and nonrestorative sleep with hypersomnia which can increase risk for motor vehicle accidents.  Untreated sleep apnea is also a risk factor for development of atrial fibrillation, pulmonary hypertension and stroke. The patient is also instructed to get the supplies from the [facility] and and change them on a regular basis.  A prescription for supplies has been sent to the [facility].  I have also discussed the good sleep hygiene habits and adequate amount of sleep needed for good health.  Obesity morbid with BMI is Body mass index is 43.89 kg/m².. I have discuss the relationship between the weight and sleep apnea. The benefit of weight loss in reducing severity of sleep apnea was discussed. Discussed diet and exercise with the patient to achieve ideal BMI.   Return in about 1 year (around 6/11/2026)  for with smart card down load. . Patient's questions were answered.      Becca Edward MD  Sleep Medicine.  Medical Director, [facility]  6/11/2025 ,

## (undated) DEVICE — COLON KIT: Brand: MEDLINE INDUSTRIES, INC.

## (undated) DEVICE — SINGLE-USE BIOPSY FORCEPS: Brand: RADIAL JAW 4

## (undated) DEVICE — SNAR E/S POLYP SNAREMASTER OVL/10MM 2.8X2300MM YEL

## (undated) DEVICE — Device: Brand: DEFENDO AIR/WATER/SUCTION AND BIOPSY VALVE

## (undated) DEVICE — SOL IRRG H2O PL/BG 1000ML STRL